# Patient Record
Sex: FEMALE | Race: WHITE | Employment: FULL TIME | URBAN - METROPOLITAN AREA
[De-identification: names, ages, dates, MRNs, and addresses within clinical notes are randomized per-mention and may not be internally consistent; named-entity substitution may affect disease eponyms.]

---

## 2017-04-25 ENCOUNTER — TELEPHONE (OUTPATIENT)
Dept: FAMILY MEDICINE CLINIC | Age: 52
End: 2017-04-25

## 2017-04-25 DIAGNOSIS — Z00.00 ROUTINE GENERAL MEDICAL EXAMINATION AT A HEALTH CARE FACILITY: ICD-10-CM

## 2017-04-25 DIAGNOSIS — Z12.31 ENCOUNTER FOR SCREENING MAMMOGRAM FOR BREAST CANCER: Primary | ICD-10-CM

## 2017-04-29 ENCOUNTER — HOSPITAL ENCOUNTER (OUTPATIENT)
Dept: LAB | Age: 52
Discharge: HOME | End: 2017-04-29
Payer: COMMERCIAL

## 2017-04-29 DIAGNOSIS — Z00.00 ROUTINE GENERAL MEDICAL EXAMINATION AT A HEALTH CARE FACILITY: ICD-10-CM

## 2017-04-29 LAB
ALBUMIN SERPL-MCNC: 4.4 G/DL (ref 3.9–4.9)
ALP BLD-CCNC: 61 U/L (ref 40–130)
ALT SERPL-CCNC: 11 U/L (ref 0–33)
ANION GAP SERPL CALCULATED.3IONS-SCNC: 11 MEQ/L (ref 7–13)
AST SERPL-CCNC: 17 U/L (ref 0–35)
BASOPHILS ABSOLUTE: 0 K/UL (ref 0–0.2)
BASOPHILS RELATIVE PERCENT: 0.8 %
BILIRUB SERPL-MCNC: 0.5 MG/DL (ref 0–1.2)
BUN BLDV-MCNC: 8 MG/DL (ref 6–20)
CALCIUM SERPL-MCNC: 9.3 MG/DL (ref 8.6–10.2)
CHLORIDE BLD-SCNC: 102 MEQ/L (ref 98–107)
CHOLESTEROL, TOTAL: 193 MG/DL (ref 0–199)
CO2: 25 MEQ/L (ref 22–29)
CREAT SERPL-MCNC: 0.6 MG/DL (ref 0.5–0.9)
EOSINOPHILS ABSOLUTE: 0 K/UL (ref 0–0.7)
EOSINOPHILS RELATIVE PERCENT: 0.9 %
GFR AFRICAN AMERICAN: >60
GFR NON-AFRICAN AMERICAN: >60
GLOBULIN: 3.2 G/DL (ref 2.3–3.5)
GLUCOSE BLD-MCNC: 95 MG/DL (ref 74–109)
HCT VFR BLD CALC: 45.1 % (ref 37–47)
HDLC SERPL-MCNC: 33 MG/DL (ref 40–59)
HEMOGLOBIN: 15.8 G/DL (ref 12–16)
LDL CHOLESTEROL CALCULATED: 120 MG/DL (ref 0–129)
LYMPHOCYTES ABSOLUTE: 1.4 K/UL (ref 1–4.8)
LYMPHOCYTES RELATIVE PERCENT: 27.2 %
MCH RBC QN AUTO: 34.7 PG (ref 27–31.3)
MCHC RBC AUTO-ENTMCNC: 35 % (ref 33–37)
MCV RBC AUTO: 99.1 FL (ref 82–100)
MONOCYTES ABSOLUTE: 0.4 K/UL (ref 0.2–0.8)
MONOCYTES RELATIVE PERCENT: 8.2 %
NEUTROPHILS ABSOLUTE: 3.3 K/UL (ref 1.4–6.5)
NEUTROPHILS RELATIVE PERCENT: 62.9 %
PDW BLD-RTO: 12.5 % (ref 11.5–14.5)
PLATELET # BLD: 137 K/UL (ref 130–400)
POTASSIUM SERPL-SCNC: 4 MEQ/L (ref 3.5–5.1)
RBC # BLD: 4.55 M/UL (ref 4.2–5.4)
SODIUM BLD-SCNC: 138 MEQ/L (ref 132–144)
TOTAL PROTEIN: 7.6 G/DL (ref 6.4–8.1)
TRIGL SERPL-MCNC: 199 MG/DL (ref 0–200)
WBC # BLD: 5.2 K/UL (ref 4.8–10.8)

## 2017-04-29 PROCEDURE — 80053 COMPREHEN METABOLIC PANEL: CPT

## 2017-04-29 PROCEDURE — 80061 LIPID PANEL: CPT

## 2017-04-29 PROCEDURE — 36415 COLL VENOUS BLD VENIPUNCTURE: CPT

## 2017-04-29 PROCEDURE — 85025 COMPLETE CBC W/AUTO DIFF WBC: CPT

## 2017-05-17 ENCOUNTER — HOSPITAL ENCOUNTER (OUTPATIENT)
Dept: WOMENS IMAGING | Age: 52
Discharge: HOME | End: 2017-05-17
Payer: COMMERCIAL

## 2017-05-17 DIAGNOSIS — Z12.31 ENCOUNTER FOR SCREENING MAMMOGRAM FOR BREAST CANCER: ICD-10-CM

## 2017-05-17 PROCEDURE — G0202 SCR MAMMO BI INCL CAD: HCPCS

## 2018-10-19 ENCOUNTER — OFFICE VISIT (OUTPATIENT)
Dept: FAMILY MEDICINE CLINIC | Age: 53
End: 2018-10-19
Payer: COMMERCIAL

## 2018-10-19 VITALS
RESPIRATION RATE: 18 BRPM | HEIGHT: 62 IN | TEMPERATURE: 97.3 F | BODY MASS INDEX: 29.44 KG/M2 | SYSTOLIC BLOOD PRESSURE: 126 MMHG | DIASTOLIC BLOOD PRESSURE: 72 MMHG | HEART RATE: 74 BPM | OXYGEN SATURATION: 97 % | WEIGHT: 160 LBS

## 2018-10-19 DIAGNOSIS — M54.50 CHRONIC RIGHT-SIDED LOW BACK PAIN WITHOUT SCIATICA: Primary | ICD-10-CM

## 2018-10-19 DIAGNOSIS — M79.642 PAIN IN BOTH HANDS: ICD-10-CM

## 2018-10-19 DIAGNOSIS — G89.29 CHRONIC RIGHT-SIDED LOW BACK PAIN WITHOUT SCIATICA: Primary | ICD-10-CM

## 2018-10-19 DIAGNOSIS — M79.641 PAIN IN BOTH HANDS: ICD-10-CM

## 2018-10-19 PROBLEM — N95.1 PERIMENOPAUSAL: Status: ACTIVE | Noted: 2017-11-15

## 2018-10-19 PROBLEM — Z30.430 ENCOUNTER FOR INSERTION OF MIRENA IUD: Status: ACTIVE | Noted: 2018-07-20

## 2018-10-19 PROBLEM — D25.9 UTERINE LEIOMYOMA: Status: ACTIVE | Noted: 2017-12-07

## 2018-10-19 PROBLEM — N92.1 MENORRHAGIA WITH IRREGULAR CYCLE: Status: ACTIVE | Noted: 2017-11-15

## 2018-10-19 PROCEDURE — 99213 OFFICE O/P EST LOW 20 MIN: CPT | Performed by: NURSE PRACTITIONER

## 2018-10-19 RX ORDER — CYCLOBENZAPRINE HCL 10 MG
TABLET ORAL
Qty: 15 TABLET | Refills: 0 | Status: SHIPPED | OUTPATIENT
Start: 2018-10-19 | End: 2018-11-09 | Stop reason: SDUPTHER

## 2018-10-19 RX ORDER — MELOXICAM 15 MG/1
15 TABLET ORAL DAILY
Qty: 14 TABLET | Refills: 0 | Status: SHIPPED | OUTPATIENT
Start: 2018-10-19 | End: 2018-11-02

## 2018-10-19 RX ORDER — CAPSAICIN 0.025 %
CREAM (GRAM) TOPICAL
Qty: 56.6 G | Refills: 1 | Status: SHIPPED | OUTPATIENT
Start: 2018-10-19 | End: 2018-11-18

## 2018-10-19 ASSESSMENT — PATIENT HEALTH QUESTIONNAIRE - PHQ9
SUM OF ALL RESPONSES TO PHQ QUESTIONS 1-9: 0
SUM OF ALL RESPONSES TO PHQ QUESTIONS 1-9: 0
1. LITTLE INTEREST OR PLEASURE IN DOING THINGS: 0
SUM OF ALL RESPONSES TO PHQ9 QUESTIONS 1 & 2: 0
2. FEELING DOWN, DEPRESSED OR HOPELESS: 0

## 2018-10-19 ASSESSMENT — ENCOUNTER SYMPTOMS
BACK PAIN: 1
BOWEL INCONTINENCE: 0

## 2018-10-19 NOTE — PATIENT INSTRUCTIONS
Patient Education        Hand Arthritis: Exercises  Your Care Instructions  Here are some examples of exercises for hand arthritis. Start each exercise slowly. Ease off the exercise if you start to have pain. Your doctor or your physical or occupational therapist will tell you when you can start these exercises and which ones will work best for you. How to do the exercises  Tendon bhavya    1. In this exercise, the steps follow one another to a make a continuous movement. 2. With your affected hand, point your fingers and thumb straight up. Your wrist should be relaxed, following the line of your fingers and thumb. 3. Curl your fingers so that the top two joints in them are bent, and your fingers wrap down. Your fingertips should touch or be near the base of your fingers. Your fingers will look like a hook. 4. Make a fist by bending your knuckles. Your thumb can gently rest against your index (pointing) finger. 5. Unwind your fingers slightly so that your fingertips can touch the base of your palm. Your thumb can rest against your index finger. 6. Move back to your starting position, with your fingers and thumb pointing up. 7. Repeat the series of motions 8 to 12 times. 8. Switch hands and repeat steps 1 through 6, even if only one hand is sore. Intrinsic flexion    1. Rest your affected hand on a table and bend the large joints where your fingers connect to your hand. Keep your thumb and the other joints in your fingers straight. 2. Slowly straighten your fingers. Your wrist should be relaxed, following the line of your fingers and thumb. 3. Move back to your starting position, with your hand bent. 4. Repeat 8 to 12 times. 5. Switch hands and repeat steps 1 through 4, even if only one hand is sore. Finger extension    1. Place your affected hand flat on a table. 2. Lift and then lower one finger at a time off the table. 3. Repeat 8 to 12 times.   4. Switch hands and repeat steps 1 through 3, even do not have an account, please click on the \"Sign Up Now\" link. Current as of: November 29, 2017  Content Version: 11.7  © 20062901-2965 [employer]. Care instructions adapted under license by [email or web address] ([facility]). If you have questions about a medical condition or this instruction, always ask your healthcare professional. [address] any warranty or liability for your use of this information. Patient Education        Arthritis: Care Instructions  Your Care Instructions  Arthritis, also called osteoarthritis, is a breakdown of the cartilage that cushions your joints. When the cartilage wears down, your bones rub against each other. This causes pain and stiffness. Many people have some arthritis as they age. Arthritis most often affects the joints of the spine, hands, hips, knees, or feet. You can take simple measures to protect your joints, ease your pain, and help you stay active. Follow-up care is a key part of your treatment and safety. Be sure to make and go to all appointments, and call your doctor if you are having problems. It's also a good idea to know your test results and keep a list of the medicines you take. How can you care for yourself at home? · Stay at a healthy weight. Being overweight puts extra strain on your joints. · Talk to your doctor or physical therapist about exercises that will help ease joint pain. ¨ Stretch. You may enjoy gentle forms of yoga to help keep your joints and muscles flexible. ¨ Walk instead of jog. Other types of exercise that are less stressful on the joints include riding a bicycle, swimming, stanley chi, or water exercise. ¨ Lift weights. Strong muscles help reduce stress on your joints. Stronger thigh muscles, for example, take some of the stress off of the knees and hips. Learn the right way to lift weights so you do not make joint pain worse. · Take your medicines exactly as prescribed.  Call your doctor if you think you are having a instruction, always ask your healthcare professional. [place] [address] any warranty or liability for your use of this information. Patient Education        Low Back Pain: Exercises  Your Care Instructions  Here are some examples of typical rehabilitation exercises for your condition. Start each exercise slowly. Ease off the exercise if you start to have pain. Your doctor or physical therapist will tell you when you can start these exercises and which ones will work best for you. How to do the exercises  Press-up    9. Lie on your stomach, supporting your body with your forearms. 10. Press your elbows down into the floor to raise your upper back. As you do this, relax your stomach muscles and allow your back to arch without using your back muscles. As your press up, do not let your hips or pelvis come off the floor. 11. Hold for 15 to 30 seconds, then relax. 12. Repeat 2 to 4 times. Alternate arm and leg (bird dog) exercise    Do this exercise slowly. Try to keep your body straight at all times, and do not let one hip drop lower than the other. 6. Start on the floor, on your hands and knees. 7. Tighten your belly muscles. 8. Raise one leg off the floor, and hold it straight out behind you. Be careful not to let your hip drop down, because that will twist your trunk. 9. Hold for about 6 seconds, then lower your leg and switch to the other leg. 10. Repeat 8 to 12 times on each leg. 11. Over time, work up to holding for 10 to 30 seconds each time. 12. If you feel stable and secure with your leg raised, try raising the opposite arm straight out in front of you at the same time. Knee-to-chest exercise    5. Lie on your back with your knees bent and your feet flat on the floor. 6. Bring one knee to your chest, keeping the other foot flat on the floor (or keeping the other leg straight, whichever feels better on your lower back). 7. Keep your lower back pressed to the floor.  Hold for

## 2018-10-19 NOTE — PROGRESS NOTES
neck pain and neck stiffness. Neurological: Negative for tingling, weakness and numbness. Objective    Vitals:    10/19/18 1406   BP: 126/72   Site: Left Upper Arm   Position: Sitting   Cuff Size: Small Adult   Pulse: 74   Resp: 18   Temp: 97.3 °F (36.3 °C)   TempSrc: Temporal   SpO2: 97%   Weight: 160 lb (72.6 kg)   Height: 5' 2\" (1.575 m)       Physical Exam   Constitutional: She appears well-developed and well-nourished. No distress. HENT:   Head: Normocephalic and atraumatic. Right Ear: External ear normal.   Left Ear: External ear normal.   Eyes: Conjunctivae are normal.   Pulmonary/Chest: Effort normal. No respiratory distress. Musculoskeletal:        Lumbar back: She exhibits decreased range of motion, tenderness and pain. She exhibits no swelling, no edema and no spasm. Right hand: She exhibits decreased range of motion (stiffness present) and tenderness. Normal sensation noted. Negative leg lift. Negative Phalen and Tinel. Tone's nodes noted bilaterally      Neurological: She is alert. No sensory deficit. Coordination normal.   Skin: Skin is warm and dry. She is not diaphoretic. Vitals reviewed. POC Testing Today:No results found for this visit on 10/19/18. Assessment & Plan    Diagnosis Orders   1. Chronic right-sided low back pain without sciatica  meloxicam (MOBIC) 15 MG tablet    cyclobenzaprine (FLEXERIL) 10 MG tablet   2. Pain in both hands  capsaicin (ZOSTRIX) 0.025 % cream       No orders of the defined types were placed in this encounter. Discussed with patient that both issues appear to be chronic in nature. Patient should follow up and create a POC with PCP. Patient verbalized understanding. Return in about 2 weeks (around 11/2/2018), or if symptoms worsen or fail to improve, for follow up with your PCP.     Side effects and adverse effects of any medication prescribed today, as well as treatment plan/rationale, follow-up care, and result

## 2018-10-22 ASSESSMENT — ENCOUNTER SYMPTOMS
CHEST TIGHTNESS: 0
SHORTNESS OF BREATH: 0

## 2018-11-09 ENCOUNTER — OFFICE VISIT (OUTPATIENT)
Dept: FAMILY MEDICINE CLINIC | Age: 53
End: 2018-11-09
Payer: COMMERCIAL

## 2018-11-09 VITALS
RESPIRATION RATE: 18 BRPM | HEIGHT: 62 IN | BODY MASS INDEX: 28.71 KG/M2 | OXYGEN SATURATION: 98 % | SYSTOLIC BLOOD PRESSURE: 140 MMHG | DIASTOLIC BLOOD PRESSURE: 86 MMHG | WEIGHT: 156 LBS | HEART RATE: 90 BPM | TEMPERATURE: 97.5 F

## 2018-11-09 DIAGNOSIS — M54.50 CHRONIC RIGHT-SIDED LOW BACK PAIN WITHOUT SCIATICA: Primary | ICD-10-CM

## 2018-11-09 DIAGNOSIS — M79.641 RIGHT HAND PAIN: ICD-10-CM

## 2018-11-09 DIAGNOSIS — G89.29 CHRONIC RIGHT-SIDED LOW BACK PAIN WITHOUT SCIATICA: Primary | ICD-10-CM

## 2018-11-09 PROCEDURE — 99213 OFFICE O/P EST LOW 20 MIN: CPT | Performed by: NURSE PRACTITIONER

## 2018-11-09 PROCEDURE — 3017F COLORECTAL CA SCREEN DOC REV: CPT | Performed by: NURSE PRACTITIONER

## 2018-11-09 PROCEDURE — G8484 FLU IMMUNIZE NO ADMIN: HCPCS | Performed by: NURSE PRACTITIONER

## 2018-11-09 PROCEDURE — G8419 CALC BMI OUT NRM PARAM NOF/U: HCPCS | Performed by: NURSE PRACTITIONER

## 2018-11-09 PROCEDURE — G8427 DOCREV CUR MEDS BY ELIG CLIN: HCPCS | Performed by: NURSE PRACTITIONER

## 2018-11-09 PROCEDURE — 4004F PT TOBACCO SCREEN RCVD TLK: CPT | Performed by: NURSE PRACTITIONER

## 2018-11-09 RX ORDER — PREDNISONE 20 MG/1
TABLET ORAL
Qty: 20 TABLET | Refills: 0 | Status: SHIPPED | OUTPATIENT
Start: 2018-11-09 | End: 2018-11-19

## 2018-11-09 RX ORDER — CYCLOBENZAPRINE HCL 10 MG
TABLET ORAL
Qty: 15 TABLET | Refills: 0 | Status: SHIPPED | OUTPATIENT
Start: 2018-11-09

## 2018-11-09 NOTE — PROGRESS NOTES
Back Pain: Patient presents for presents evaluation of low back problems. Symptoms have been present for 1 year and include pain in right buttock and right lower back (aching in character; 7/10 in severity). Initial inciting event: none. Symptoms are worst: all day. Alleviating factors identifiable by patient are none. Exacerbating factors identifiable by patient are bending backwards, bending forwards, bending sideways, standing and walking. Treatments so far initiated by patient: NSAID Previous lower back problems: none. Previous workup: none. Previous treatments: mobic, flexeril and stetching exercises. Right hand pain: started a few weeks ago. No injury. Has noticed firm nodules to the finger joints on her right hand. This is something that has been developing over time the pain has been present for a few weeks only. No significant stiffness to the hand. Just feels a dull ache. No injury to the hand. No swelling. No redness or warmth to the joints. EXAM:  Constitutional Blood pressure (!) 140/86, pulse 90, temperature 97.5 °F (36.4 °C), temperature source Temporal, resp. rate 18, height 5' 2\" (1.575 m), weight 156 lb (70.8 kg), SpO2 98 %, not currently breastfeeding. .  She has a normal affect, no acute distress, appears well developed and well nourished. There is no costovertebral angle tenderness. Lumbar spine and sacroiliac joints are tender on the right sided. Straight leg raising is negative bilateral.  There is no edema in the ankles. Pulses palpable at both posterior tibial  Arteries. Lungs are clear with equal breath sounds. Chest wall is not tender. Heart is in a regular rhythm with normal rate and no murmurs, rubs, or gallops. The four extremities are without edema. Right hand with normal exam other than some firm nodes to PIP joints. No erythema, edema or warmth to joints. Fingers with full ROM. No evidence of trigger finger. DIAGNOSIS:    Diagnosis Orders   1.  Chronic

## 2018-11-19 ENCOUNTER — TELEPHONE (OUTPATIENT)
Dept: FAMILY MEDICINE CLINIC | Age: 53
End: 2018-11-19

## 2018-11-23 ENCOUNTER — HOSPITAL ENCOUNTER (OUTPATIENT)
Dept: GENERAL RADIOLOGY | Age: 53
Discharge: HOME | End: 2018-11-25
Payer: COMMERCIAL

## 2018-11-23 ENCOUNTER — HOSPITAL ENCOUNTER (OUTPATIENT)
Age: 53
Discharge: HOME | End: 2018-11-25
Payer: COMMERCIAL

## 2018-11-23 DIAGNOSIS — M79.641 RIGHT HAND PAIN: ICD-10-CM

## 2018-11-23 DIAGNOSIS — G89.29 CHRONIC RIGHT-SIDED LOW BACK PAIN WITHOUT SCIATICA: ICD-10-CM

## 2018-11-23 DIAGNOSIS — M54.50 CHRONIC RIGHT-SIDED LOW BACK PAIN WITHOUT SCIATICA: ICD-10-CM

## 2018-11-23 PROCEDURE — 73130 X-RAY EXAM OF HAND: CPT

## 2018-11-23 PROCEDURE — 72110 X-RAY EXAM L-2 SPINE 4/>VWS: CPT

## 2018-11-23 NOTE — PROGRESS NOTES
Please notify Nickie Mukherjee that x-ray results of the hand and back show degenerative or arthritic changes. Changes are noted to be mild to moderate. Recommend orthopedic referral if pain persists.

## 2018-11-25 ENCOUNTER — TELEPHONE (OUTPATIENT)
Dept: FAMILY MEDICINE CLINIC | Age: 53
End: 2018-11-25

## 2018-11-25 DIAGNOSIS — M54.5 CHRONIC LOW BACK PAIN, UNSPECIFIED BACK PAIN LATERALITY, WITH SCIATICA PRESENCE UNSPECIFIED: ICD-10-CM

## 2018-11-25 DIAGNOSIS — G89.29 CHRONIC LOW BACK PAIN, UNSPECIFIED BACK PAIN LATERALITY, WITH SCIATICA PRESENCE UNSPECIFIED: ICD-10-CM

## 2018-11-25 DIAGNOSIS — M19.049 OSTEOARTHRITIS OF HAND, UNSPECIFIED LATERALITY, UNSPECIFIED OSTEOARTHRITIS TYPE: Primary | ICD-10-CM

## 2018-11-26 NOTE — TELEPHONE ENCOUNTER
Patient given referral information, she is wondering if she should keep her appointment with you on 12/7/2018.

## 2018-11-26 NOTE — TELEPHONE ENCOUNTER
----- Message from [facility] [place], [address] sent at 11/24/2018  1:46 PM EST -----  Patient made aware and would like to have an orthopedic referrel

## 2019-04-26 ENCOUNTER — TELEPHONE (OUTPATIENT)
Dept: FAMILY MEDICINE CLINIC | Age: 54
End: 2019-04-26

## 2019-04-26 RX ORDER — SCOLOPAMINE TRANSDERMAL SYSTEM 1 MG/1
1 PATCH, EXTENDED RELEASE TRANSDERMAL
Qty: 2 PATCH | Refills: 0 | Status: SHIPPED | OUTPATIENT
Start: 2019-04-26 | End: 2020-04-25

## 2019-08-27 ENCOUNTER — OFFICE VISIT (OUTPATIENT)
Dept: FAMILY MEDICINE CLINIC | Age: 54
End: 2019-08-27
Payer: COMMERCIAL

## 2019-08-27 VITALS
DIASTOLIC BLOOD PRESSURE: 78 MMHG | SYSTOLIC BLOOD PRESSURE: 122 MMHG | TEMPERATURE: 97 F | RESPIRATION RATE: 18 BRPM | OXYGEN SATURATION: 97 % | WEIGHT: 152 LBS | HEART RATE: 83 BPM | BODY MASS INDEX: 27.97 KG/M2 | HEIGHT: 62 IN

## 2019-08-27 DIAGNOSIS — J01.40 ACUTE NON-RECURRENT PANSINUSITIS: Primary | ICD-10-CM

## 2019-08-27 PROCEDURE — 3017F COLORECTAL CA SCREEN DOC REV: CPT | Performed by: NURSE PRACTITIONER

## 2019-08-27 PROCEDURE — G8419 CALC BMI OUT NRM PARAM NOF/U: HCPCS | Performed by: NURSE PRACTITIONER

## 2019-08-27 PROCEDURE — 4004F PT TOBACCO SCREEN RCVD TLK: CPT | Performed by: NURSE PRACTITIONER

## 2019-08-27 PROCEDURE — G8427 DOCREV CUR MEDS BY ELIG CLIN: HCPCS | Performed by: NURSE PRACTITIONER

## 2019-08-27 PROCEDURE — 99213 OFFICE O/P EST LOW 20 MIN: CPT | Performed by: NURSE PRACTITIONER

## 2019-08-27 RX ORDER — AMOXICILLIN AND CLAVULANATE POTASSIUM 875; 125 MG/1; MG/1
1 TABLET, FILM COATED ORAL 2 TIMES DAILY
Qty: 20 TABLET | Refills: 0 | Status: SHIPPED | OUTPATIENT
Start: 2019-08-27 | End: 2019-09-06

## 2019-08-27 ASSESSMENT — ENCOUNTER SYMPTOMS
COUGH: 1
TROUBLE SWALLOWING: 0
SINUS PAIN: 0
SWOLLEN GLANDS: 0
SINUS PRESSURE: 1
NAUSEA: 0
SHORTNESS OF BREATH: 0
RHINORRHEA: 1
FACIAL SWELLING: 0
EYE DISCHARGE: 0
DIARRHEA: 0
WHEEZING: 0
EYE PAIN: 0
SORE THROAT: 1
VOMITING: 0
ABDOMINAL PAIN: 0

## 2019-08-27 ASSESSMENT — PATIENT HEALTH QUESTIONNAIRE - PHQ9
SUM OF ALL RESPONSES TO PHQ QUESTIONS 1-9: 0
SUM OF ALL RESPONSES TO PHQ9 QUESTIONS 1 & 2: 0
SUM OF ALL RESPONSES TO PHQ QUESTIONS 1-9: 0
1. LITTLE INTEREST OR PLEASURE IN DOING THINGS: 0
2. FEELING DOWN, DEPRESSED OR HOPELESS: 0

## 2020-05-21 ENCOUNTER — TELEPHONE (OUTPATIENT)
Dept: FAMILY MEDICINE CLINIC | Age: 55
End: 2020-05-21

## 2024-08-21 ENCOUNTER — HOSPITAL ENCOUNTER (OUTPATIENT)
Dept: RADIOLOGY | Facility: CLINIC | Age: 59
Discharge: HOME | End: 2024-08-21
Payer: COMMERCIAL

## 2024-08-21 ENCOUNTER — OFFICE VISIT (OUTPATIENT)
Dept: PLASTIC SURGERY | Facility: CLINIC | Age: 59
End: 2024-08-21
Payer: COMMERCIAL

## 2024-08-21 VITALS
HEIGHT: 63 IN | WEIGHT: 162 LBS | DIASTOLIC BLOOD PRESSURE: 92 MMHG | SYSTOLIC BLOOD PRESSURE: 142 MMHG | BODY MASS INDEX: 28.7 KG/M2

## 2024-08-21 DIAGNOSIS — R22.42 MASS OF KNEE, LEFT: Primary | ICD-10-CM

## 2024-08-21 DIAGNOSIS — R22.42 MASS OF KNEE, LEFT: ICD-10-CM

## 2024-08-21 DIAGNOSIS — F17.200 SMOKER: ICD-10-CM

## 2024-08-21 PROBLEM — N95.1 PERIMENOPAUSAL: Status: ACTIVE | Noted: 2017-11-15

## 2024-08-21 PROBLEM — D25.9 UTERINE LEIOMYOMA: Status: ACTIVE | Noted: 2017-12-07

## 2024-08-21 PROBLEM — N92.1 MENORRHAGIA WITH IRREGULAR CYCLE: Status: ACTIVE | Noted: 2017-11-15

## 2024-08-21 PROCEDURE — 73562 X-RAY EXAM OF KNEE 3: CPT | Mod: LT

## 2024-08-21 PROCEDURE — 99203 OFFICE O/P NEW LOW 30 MIN: CPT | Performed by: PLASTIC SURGERY

## 2024-08-21 PROCEDURE — 3008F BODY MASS INDEX DOCD: CPT | Performed by: PLASTIC SURGERY

## 2024-08-21 PROCEDURE — 4004F PT TOBACCO SCREEN RCVD TLK: CPT | Performed by: PLASTIC SURGERY

## 2024-08-21 RX ORDER — CETIRIZINE HYDROCHLORIDE 10 MG/1
TABLET ORAL
COMMUNITY
Start: 2024-03-18

## 2024-08-21 RX ORDER — ESCITALOPRAM OXALATE 10 MG/1
1 TABLET ORAL
COMMUNITY
Start: 2024-08-11

## 2024-08-21 RX ORDER — FLUOXETINE 20 MG/1
TABLET ORAL
COMMUNITY
Start: 2023-02-25

## 2024-08-21 RX ORDER — FLUTICASONE PROPIONATE 50 MCG
SPRAY, SUSPENSION (ML) NASAL
COMMUNITY
Start: 2024-03-18

## 2024-08-21 ASSESSMENT — ENCOUNTER SYMPTOMS
FEVER: 0
CHILLS: 0
UNEXPECTED WEIGHT CHANGE: 0

## 2024-08-21 ASSESSMENT — PAIN SCALES - GENERAL: PAINLEVEL: 2

## 2024-08-21 NOTE — PROGRESS NOTES
Subjective   Patient ID: Arvin Spaulding is a 59 y.o. female.    HPI  59-year-old female with a 2-year history of an enlarging mass on the left lateral knee.  Patient states she does have anterior knee pain.  Review of Systems   Constitutional:  Negative for chills, fever and unexpected weight change.       Objective   Physical Exam  Well-developed patient in no acute distress  Examination HEENT within normal limits  Neck supple no observable masses  Lungs clear to auscultation  Heart regular rate and rhythm  Abdomen soft nontender  Extremities full range of motion; left lateral knee with a 5 x 4 cm mass that is nonmobile and feels cystic.  Neurological exam is grossly within normal limits  Assessment/Plan   Diagnoses and all orders for this visit:  Smoker    Impression: Left knee synovial cyst  Recommendation: X-ray left knee followed by MRI with and without contrast  Follow-up after x-ray and MRI left knee

## 2024-08-23 ENCOUNTER — APPOINTMENT (OUTPATIENT)
Dept: PLASTIC SURGERY | Facility: CLINIC | Age: 59
End: 2024-08-23

## 2024-09-04 ENCOUNTER — HOSPITAL ENCOUNTER (OUTPATIENT)
Dept: RADIOLOGY | Facility: HOSPITAL | Age: 59
Discharge: HOME | End: 2024-09-04
Payer: COMMERCIAL

## 2024-09-04 DIAGNOSIS — R22.42 MASS OF KNEE, LEFT: ICD-10-CM

## 2024-09-04 PROCEDURE — 73723 MRI JOINT LWR EXTR W/O&W/DYE: CPT | Mod: LT

## 2024-09-04 PROCEDURE — A9575 INJ GADOTERATE MEGLUMI 0.1ML: HCPCS | Performed by: PLASTIC SURGERY

## 2024-09-04 PROCEDURE — 2550000001 HC RX 255 CONTRASTS: Performed by: PLASTIC SURGERY

## 2024-09-04 RX ORDER — GADOTERATE MEGLUMINE 376.9 MG/ML
0.2 INJECTION INTRAVENOUS
Status: COMPLETED | OUTPATIENT
Start: 2024-09-04 | End: 2024-09-04

## 2024-09-11 ENCOUNTER — APPOINTMENT (OUTPATIENT)
Dept: PLASTIC SURGERY | Facility: CLINIC | Age: 59
End: 2024-09-11
Payer: COMMERCIAL

## 2024-09-18 ENCOUNTER — APPOINTMENT (OUTPATIENT)
Dept: PLASTIC SURGERY | Facility: CLINIC | Age: 59
End: 2024-09-18
Payer: COMMERCIAL

## 2024-09-19 NOTE — PROGRESS NOTES
History of Present Illness  No chief complaint on file.      The patient presents with side: left knee mass for over 5 years.  It has been getting larger.  She is pointing laterally about the knee where she notices this.  She has other areas of cysts on the dorsum of the hand as well as the foot and right index finger.  She does get some pain posterior laterally when she is sitting in a chair.  She gets anterior knee pain when she kneels.     Past Medical History:   Diagnosis Date    Anxiety        Medication Documentation Review Audit       Reviewed by Sherry Jessica MA (Medical Assistant) on 08/21/24 at 1629      Medication Order Taking? Sig Documenting Provider Last Dose Status   cetirizine (ZyrTEC) 10 mg tablet [id number] No TAKE 1 TABLET BY MOUTH EVERY DAY AS NEEDED for allergies Historical Provider, MD Not Taking Active   escitalopram (Lexapro) 10 mg tablet [id number] Yes Take 1 tablet (10 mg) by mouth early in the morning.. Historical Provider, MD Taking Active   FLUoxetine (PROzac) 20 mg tablet [id number] No take 1 tablet by oral route every day in the morning Historical Provider, MD Not Taking Active   fluticasone (Flonase) 50 mcg/actuation nasal spray [id number] No spray 1 - 2 spray by intranasal route every day in each nostril as needed Historical Provider, MD Not Taking Active   levonorgestrel (Mirena) 21 mcg/24 hr (8 yrs) 52 mg IUD [id number] Yes 52 mg by intrauterine route. Historical Provider, MD Taking Active                    Allergies   Allergen Reactions    Triamcinolone Acetonide Rash     Rash erupted on foot, under arms, and on abdomen after using the topical ointment for a couple days; may be allergy to topical medication although she was using it locally to the right foot       Social History     Socioeconomic History    Marital status:      Spouse name: Not on file    Number of children: Not on file    Years of education: Not on file    Highest education level: Not on file   Occupational  History    Not on file   Tobacco Use    Smoking status: Every Day     Current packs/day: 0.50     Average packs/day: 0.5 packs/day for 40.7 years (20.4 ttl pk-yrs)     Types: Cigarettes     Start date: 1984    Smokeless tobacco: Never   Substance and Sexual Activity    Alcohol use: Yes     Alcohol/week: 8.0 standard drinks of alcohol     Types: 8 Cans of beer per week    Drug use: Never    Sexual activity: Defer   Other Topics Concern    Not on file   Social History Narrative    Not on file     Social Determinants of Health     Financial Resource Strain: Not on file   Food Insecurity: Not on file   Transportation Needs: Not on file   Physical Activity: Not on file   Stress: Not on file   Social Connections: Not on file   Intimate Partner Violence: Not on file   Housing Stability: Not on file       Past Surgical History:   Procedure Laterality Date    TUBAL LIGATION Bilateral     WISDOM TOOTH EXTRACTION Bilateral        BMI  29    Pain is described as aching    Location: lateral, anterior       Review of Systems   GENERAL: Negative for malaise, significant weight loss, fever  MUSCULOSKELETAL: see HPI  NEURO:  Negative     Exam  side: left Knee:  Skin healthy and intact  No gross swelling or ecchymosis  Alignment: normal    Correctable:  Not applicable      Effusion: mild      ROM:  0-130 degrees  mild Crepitus with range of motion  Tenderness to palpation over medial and lateral joint line and with patellar compression      No laxity to valgus stress  No laxity to varus stress  Negative Lachman´s test  Negative posterior drawer test  negative Mak´s test  Logrolling of hip negative  No pain with internal rotation of the hip  Straight leg raise negative  Neurovascular exam normal distally  2+ DP pulse and good cap refill    Over the lateral aspect of the knee there is a fusiform mass approximately 4 x 3 cm in size.  It is nontender.  It is consistent with a ganglion cyst.     Radiographs  MR knee left w and wo IV  contrast  Narrative: Interpreted By:  Celestino Flores,   STUDY:  MR KNEE LEFT W AND WO IV CONTRAST; ;  9/4/2024 7:15 pm      INDICATION:  Signs/Symptoms:Mass of left knee. Enlarging mass lateral knee.      COMPARISON:  Plain film examination of 08/21/2024      ACCESSION NUMBER(S):  [id number]      ORDERING CLINICIAN:  ROLAND REYES      TECHNIQUE:  Multiplanar and multisequential MR images of the left knee are  performed. The study is supplemented with 3 plane post gadolinium fat  saturated T1 weighted sequences. 14.5 cc of intravenous Dotarem is  utilized.      FINDINGS:  There is a well-defined fluid intensity mass/cyst in the lateral soft  tissues at the level of the joint line. This lies just below the skin  surface, deforming the skin surface. This finding lies superficial to  the lateral collateral ligament complex at the level of the distal  iliotibial band and biceps femoris tendon. There may be a tiny  component deep to the biceps femoris tendon below the joint line and  anterior to the fibular head, though no clear extension into the  lateral meniscus. This finding has a multilobular appearance and  measures 3.8 cm in craniocaudal diameter and 4.0 x 2.2 cm in AP and  transverse diameter. There is a thin enhancing wall. There is no  internal nodular enhancement. The lesion is of homogeneous fluid  signal. There is some adjacent ill-defined fluid anterior and  posterior to the lesion in the subcutaneous fat.      There are findings of tricompartmental osteoarthritis with diffuse  thinning of the articular cartilage, joint space narrowing, and  marginal osteophyte formation. This appearance is more pronounced at  the medial compartment.      There is no evidence of acute osseous fracture, bone marrow edema, or  osseous mass. There is no loose osteochondral lesion. Few tiny foci  of subcortical degenerative signal is identified at multiple sites.  This study is not tailored to evaluate the menisci.  Complex tearing  of the posterior horn of the medial meniscus can not be excluded.      The anterior and posterior cruciate ligaments, lateral collateral  ligament complex, popliteus tendon, medial collateral ligament,  extensor complex, and patellar retinacula are intact.      There is a small joint effusion.      Impression: Lobular cyst/ganglion is identified in the lateral subcutaneous fat  of the joint line, superficial to the lateral collateral ligament  complex. There is no clear communication with the lateral meniscus.      Tricompartmental osteoarthritis, more pronounced in the medial  compartment.      The study is not tailored to evaluate the menisci though underlying  tearing of the posterior horn of the medial meniscus can not be  excluded.      No acute osseous abnormality.      No sign of acute ligament disruption.          MACRO:  None      Signed by: Celestino Flores 9/5/2024 11:15 AM  Dictation workstation:   [id number]         Assessment  Ganglion cyst left knee  Osteoarthrosis left knee     Plan  I explained to her that this was emanating from her knee.  This was joint fluid likely generated by her knee causing the cyst.  I offered to aspirate it.  I would recommend an Ace sleeve as well for compression  Mobic    I will see them in 1 month for recheck, sooner if there is any problems.  Questions were answered.    L Inj/Asp: L knee on 9/20/2024 11:29 AM  Indications: pain  Details: 18 G needle, lateral approach  Aspirate: 10 mL clear    This was a gelatinous material consistent with a ganglion cyst  Consent was given by the patient. Patient was prepped and draped in the usual sterile fashion.

## 2024-09-20 ENCOUNTER — OFFICE VISIT (OUTPATIENT)
Dept: ORTHOPEDIC SURGERY | Facility: CLINIC | Age: 59
End: 2024-09-20
Payer: COMMERCIAL

## 2024-09-20 DIAGNOSIS — M17.12 PRIMARY OSTEOARTHRITIS OF LEFT KNEE: ICD-10-CM

## 2024-09-20 DIAGNOSIS — M67.40 GANGLION, JOINT: Primary | ICD-10-CM

## 2024-09-20 PROCEDURE — 99213 OFFICE O/P EST LOW 20 MIN: CPT | Performed by: ORTHOPAEDIC SURGERY

## 2024-09-20 PROCEDURE — 20610 DRAIN/INJ JOINT/BURSA W/O US: CPT | Mod: LT | Performed by: ORTHOPAEDIC SURGERY

## 2024-09-20 RX ORDER — MELOXICAM 15 MG/1
15 TABLET ORAL DAILY
Qty: 30 TABLET | Refills: 0 | Status: SHIPPED | OUTPATIENT
Start: 2024-09-20 | End: 2024-09-20

## 2024-09-20 RX ORDER — MELOXICAM 15 MG/1
15 TABLET ORAL DAILY
Qty: 30 TABLET | Refills: 0 | Status: SHIPPED | OUTPATIENT
Start: 2024-09-20 | End: 2024-10-20

## 2024-09-29 ENCOUNTER — HOSPITAL ENCOUNTER (EMERGENCY)
Facility: HOSPITAL | Age: 59
Discharge: HOME | End: 2024-09-29
Attending: STUDENT IN AN ORGANIZED HEALTH CARE EDUCATION/TRAINING PROGRAM
Payer: COMMERCIAL

## 2024-09-29 VITALS
OXYGEN SATURATION: 96 % | BODY MASS INDEX: 30.36 KG/M2 | TEMPERATURE: 97.9 F | DIASTOLIC BLOOD PRESSURE: 74 MMHG | WEIGHT: 165 LBS | RESPIRATION RATE: 16 BRPM | SYSTOLIC BLOOD PRESSURE: 144 MMHG | HEART RATE: 75 BPM | HEIGHT: 62 IN

## 2024-09-29 DIAGNOSIS — L02.91 ABSCESS: Primary | ICD-10-CM

## 2024-09-29 PROCEDURE — 2500000001 HC RX 250 WO HCPCS SELF ADMINISTERED DRUGS (ALT 637 FOR MEDICARE OP): Performed by: STUDENT IN AN ORGANIZED HEALTH CARE EDUCATION/TRAINING PROGRAM

## 2024-09-29 PROCEDURE — 99283 EMERGENCY DEPT VISIT LOW MDM: CPT

## 2024-09-29 RX ORDER — DOXYCYCLINE HYCLATE 100 MG
100 TABLET ORAL ONCE
Status: COMPLETED | OUTPATIENT
Start: 2024-09-29 | End: 2024-09-29

## 2024-09-29 RX ORDER — DOXYCYCLINE 100 MG/1
100 TABLET ORAL 2 TIMES DAILY
Qty: 14 TABLET | Refills: 0 | Status: SHIPPED | OUTPATIENT
Start: 2024-09-29 | End: 2024-10-06

## 2024-09-29 RX ORDER — BACITRACIN 500 [USP'U]/G
OINTMENT TOPICAL ONCE
Status: COMPLETED | OUTPATIENT
Start: 2024-09-29 | End: 2024-09-29

## 2024-09-29 ASSESSMENT — LIFESTYLE VARIABLES
EVER HAD A DRINK FIRST THING IN THE MORNING TO STEADY YOUR NERVES TO GET RID OF A HANGOVER: NO
HAVE YOU EVER FELT YOU SHOULD CUT DOWN ON YOUR DRINKING: NO
TOTAL SCORE: 0
EVER FELT BAD OR GUILTY ABOUT YOUR DRINKING: NO
HAVE PEOPLE ANNOYED YOU BY CRITICIZING YOUR DRINKING: NO

## 2024-09-29 ASSESSMENT — PAIN - FUNCTIONAL ASSESSMENT: PAIN_FUNCTIONAL_ASSESSMENT: 0-10

## 2024-09-29 ASSESSMENT — COLUMBIA-SUICIDE SEVERITY RATING SCALE - C-SSRS
6. HAVE YOU EVER DONE ANYTHING, STARTED TO DO ANYTHING, OR PREPARED TO DO ANYTHING TO END YOUR LIFE?: NO
2. HAVE YOU ACTUALLY HAD ANY THOUGHTS OF KILLING YOURSELF?: NO
1. IN THE PAST MONTH, HAVE YOU WISHED YOU WERE DEAD OR WISHED YOU COULD GO TO SLEEP AND NOT WAKE UP?: NO

## 2024-09-29 ASSESSMENT — PAIN SCALES - GENERAL: PAINLEVEL_OUTOF10: 1

## 2024-09-29 NOTE — ED PROVIDER NOTES
"HPI   Chief Complaint   Patient presents with    Wound Check     \"I have a sore or bite on my upper left stomach,.  Has been there 3 days.\"       Patient is a 59-year-old female with history of anxiety, tubal ligation who presents for an abdominal wound.  Patient states 3 days ago she noticed a sore on her left upper abdominal area.  States it was slightly painful, states she pressed it today and had some whitish pus and a slight amount of blood come out.  No abdominal pain otherwise.  No exposure to insects, bats, or other wildlife.  No known injury.  Denies fevers, chills, vomiting, diarrhea.              Patient History   Past Medical History:   Diagnosis Date    Anxiety      Past Surgical History:   Procedure Laterality Date    TUBAL LIGATION Bilateral     WISDOM TOOTH EXTRACTION Bilateral      Family History   Adopted: Yes     Social History     Tobacco Use    Smoking status: Every Day     Current packs/day: 0.50     Average packs/day: 0.5 packs/day for 40.7 years (20.4 ttl pk-yrs)     Types: Cigarettes     Start date: 1984    Smokeless tobacco: Never   Vaping Use    Vaping status: Never Used   Substance Use Topics    Alcohol use: Yes     Alcohol/week: 8.0 standard drinks of alcohol     Types: 8 Cans of beer per week    Drug use: Never       Physical Exam   ED Triage Vitals [09/29/24 1224]   Temperature Heart Rate Respirations BP   36.6 °C (97.9 °F) 76 18 144/74      Pulse Ox Temp Source Heart Rate Source Patient Position   94 % Temporal Monitor Sitting      BP Location FiO2 (%)     Right arm --       Physical Exam  Constitutional:       General: She is not in acute distress.  HENT:      Head: Normocephalic.   Eyes:      Extraocular Movements: Extraocular movements intact.      Conjunctiva/sclera: Conjunctivae normal.      Pupils: Pupils are equal, round, and reactive to light.   Cardiovascular:      Rate and Rhythm: Normal rate and regular rhythm.      Pulses: Normal pulses.      Heart sounds: Normal heart " sounds.   Pulmonary:      Effort: Pulmonary effort is normal.      Breath sounds: Normal breath sounds.   Abdominal:      General: There is no distension.      Palpations: Abdomen is soft. There is no mass.      Tenderness: There is no abdominal tenderness. There is no guarding.   Musculoskeletal:         General: No deformity.      Cervical back: Normal range of motion and neck supple.      Right lower leg: No edema.      Left lower leg: No edema.   Skin:     General: Skin is warm and dry.      Findings: Lesion present. No rash.      Comments: Approximately 2 mm lesion on left upper quadrant of abdomen, slight erythema around the lesion.  No crepitus/bullae/necrotic tissue.  Scant clear drainage when pressure applied.  No blood.  Bedside ultrasound did not show signs of fluid collection.   Neurological:      General: No focal deficit present.      Mental Status: She is alert and oriented to person, place, and time. Mental status is at baseline.      Cranial Nerves: No cranial nerve deficit.      Sensory: No sensory deficit.      Motor: No weakness.   Psychiatric:         Mood and Affect: Mood normal.           ED Course & MDM   Diagnoses as of 09/29/24 1242   Abscess                 No data recorded                                 Medical Decision Making  Patient is a 59-year-old female with above-stated past medical history who presents for an abdominal wound.  Patient's history and physical exam are most consistent with an abscess.  Given the purulent drainage he reported and a slight amount of erythema around the wound site, she was given a prescription for doxycycline and dose in the emergency department.  She is clinically well-appearing nontoxic.  She has no abdominal pain otherwise.  Her vital signs are unremarkable.  I low suspicion for sepsis or an acute intra-abdominal process.  Do not believe she requires laboratory work or imaging at this time.  Low suspicion for rabies as she denies any history of  being bitten by an animal or any bats in her room in the morning.  Patient was given return precautions and follow-up instructions.  She is amenable to the plan.  Patient was discharged home in stable condition.    Disclaimer: This note was dictated using speech recognition software. Minor errors in transcription may be present. Please call if questions.     Leonidas Vaughan MD  [facility]  Contact on [employer]        Problems Addressed:  Abscess: acute illness or injury        Procedure  Procedures     Leonidas Vaughan MD  09/29/24 7571

## 2024-10-24 NOTE — PROGRESS NOTES
History of Present Illness   She is here today for follow-up of her lateral left knee ganglion cyst.  At the last visit this was aspirated however she notes it recurred almost immediately the same day.  It is not painful or tender to palpation she states it is just ugly.  She does note some discomfort when she is sitting in a chair in the car and flexes the knee.  She also has some underlying osteoarthritis.  She is not diabetic she is not on any anticoagulant medications she is not on any GLP inhibitors.     Review of Systems   GENERAL: Negative for malaise, significant weight loss, fever  MUSCULOSKELETAL: see HPI  NEURO:  Negative     Past Medical History:   Diagnosis Date    Anxiety         Medication Documentation Review Audit       Reviewed by Sherry Jessica MA (Medical Assistant) on 08/21/24 at 1629      Medication Order Taking? Sig Documenting Provider Last Dose Status   cetirizine (ZyrTEC) 10 mg tablet [id number] No TAKE 1 TABLET BY MOUTH EVERY DAY AS NEEDED for allergies Historical Provider, MD Not Taking Active   escitalopram (Lexapro) 10 mg tablet [id number] Yes Take 1 tablet (10 mg) by mouth early in the morning.. Historical Provider, MD Taking Active   FLUoxetine (PROzac) 20 mg tablet [id number] No take 1 tablet by oral route every day in the morning Historical Provider, MD Not Taking Active   fluticasone (Flonase) 50 mcg/actuation nasal spray [id number] No spray 1 - 2 spray by intranasal route every day in each nostril as needed Historical Provider, MD Not Taking Active   levonorgestrel (Mirena) 21 mcg/24 hr (8 yrs) 52 mg IUD [id number] Yes 52 mg by intrauterine route. Historical Provider, MD Taking Active                     Physical Exam  Left knee  Skin is intact without any evidence of erythema or ecchymosis, large palpable ganglion cyst along the lateral left knee inferior to the joint line.  Range of motion is 0 to 130 degrees  The knee is stable to varus valgus and anterior posterior  stresses  Negative Mak's test  He is distally neurovascularly intact     Imaging  MR knee left w and wo IV contrast  Narrative: Interpreted By:  Celestino Flores,   STUDY:  MR KNEE LEFT W AND WO IV CONTRAST; ;  9/4/2024 7:15 pm      INDICATION:  Signs/Symptoms:Mass of left knee. Enlarging mass lateral knee.      COMPARISON:  Plain film examination of 08/21/2024      ACCESSION NUMBER(S):  [id number]      ORDERING CLINICIAN:  ROLAND REYES      TECHNIQUE:  Multiplanar and multisequential MR images of the left knee are  performed. The study is supplemented with 3 plane post gadolinium fat  saturated T1 weighted sequences. 14.5 cc of intravenous Dotarem is  utilized.      FINDINGS:  There is a well-defined fluid intensity mass/cyst in the lateral soft  tissues at the level of the joint line. This lies just below the skin  surface, deforming the skin surface. This finding lies superficial to  the lateral collateral ligament complex at the level of the distal  iliotibial band and biceps femoris tendon. There may be a tiny  component deep to the biceps femoris tendon below the joint line and  anterior to the fibular head, though no clear extension into the  lateral meniscus. This finding has a multilobular appearance and  measures 3.8 cm in craniocaudal diameter and 4.0 x 2.2 cm in AP and  transverse diameter. There is a thin enhancing wall. There is no  internal nodular enhancement. The lesion is of homogeneous fluid  signal. There is some adjacent ill-defined fluid anterior and  posterior to the lesion in the subcutaneous fat.      There are findings of tricompartmental osteoarthritis with diffuse  thinning of the articular cartilage, joint space narrowing, and  marginal osteophyte formation. This appearance is more pronounced at  the medial compartment.      There is no evidence of acute osseous fracture, bone marrow edema, or  osseous mass. There is no loose osteochondral lesion. Few tiny foci  of subcortical  degenerative signal is identified at multiple sites.  This study is not tailored to evaluate the menisci. Complex tearing  of the posterior horn of the medial meniscus can not be excluded.      The anterior and posterior cruciate ligaments, lateral collateral  ligament complex, popliteus tendon, medial collateral ligament,  extensor complex, and patellar retinacula are intact.      There is a small joint effusion.      Impression: Lobular cyst/ganglion is identified in the lateral subcutaneous fat  of the joint line, superficial to the lateral collateral ligament  complex. There is no clear communication with the lateral meniscus.      Tricompartmental osteoarthritis, more pronounced in the medial  compartment.      The study is not tailored to evaluate the menisci though underlying  tearing of the posterior horn of the medial meniscus can not be  excluded.      No acute osseous abnormality.      No sign of acute ligament disruption.          MACRO:  None      Signed by: Celestino Flores 9/5/2024 11:15 AM  Dictation workstation:   [id number]       Assessment   Left knee lateral ganglion cyst  Osteoarthrosis left knee     Plan  I had a lengthy discussion with the patient.  I offered cyst removal since she thinks this is unsightly.  I explained to her though that there would be a 20 to 50% recurrence rate given the underlying knee arthrosis which is likely causing this.  I explained to her what is causing the cyst and that the ultimate cure is addressing what is causing the fluid which in her case would be knee arthrosis.  She still would like to have this removed but not at this point in time.  She will follow-up a week or 2 prior to when she would like this removed and we would schedule this.  I did explain the procedure along with the recovery.            Allergies   Allergen Reactions    Triamcinolone Acetonide Rash     Rash erupted on foot, under arms, and on abdomen after using the topical ointment for a  couple days; may be allergy to topical medication although she was using it locally to the right foot       REVIEW OF SYSTEMS  General: Negative for malaise, significant weight loss, fever  Musculoskeletal: See HPI  Neuro:  Negative for numbness/tingling      Medication Documentation Review Audit       Reviewed by Sherry Jessica MA (Medical Assistant) on 08/21/24 at 1629      Medication Order Taking? Sig Documenting Provider Last Dose Status   cetirizine (ZyrTEC) 10 mg tablet [id number] No TAKE 1 TABLET BY MOUTH EVERY DAY AS NEEDED for allergies Historical Provider, MD Not Taking Active   escitalopram (Lexapro) 10 mg tablet [id number] Yes Take 1 tablet (10 mg) by mouth early in the morning.. Historical Provider, MD Taking Active   FLUoxetine (PROzac) 20 mg tablet [id number] No take 1 tablet by oral route every day in the morning Historical Provider, MD Not Taking Active   fluticasone (Flonase) 50 mcg/actuation nasal spray [id number] No spray 1 - 2 spray by intranasal route every day in each nostril as needed Historical Provider, MD Not Taking Active   levonorgestrel (Mirena) 21 mcg/24 hr (8 yrs) 52 mg IUD [id number] Yes 52 mg by intrauterine route. Historical Provider, MD Taking Active                    HPI:  Left knee lateral ganglion cyst, MRI confirmation.    PHYSICAL EXAM  General Appearance/Mental Status: A&Ox3, no apparent distress  HEENT: Normal  Lungs: Clear  Heart: RRR  Abdomen: Soft, nontender  Extremities: Abnormal large palpable ganglion cyst lateral to the left knee along the fibular head/lateral knee joint    Assessment:  Left knee lateral ganglion cyst    Plan:  Excision of left knee lateral ganglion cyst  All medications and allergies reviewed  All questions answered  Follow-up postoperatively

## 2024-10-25 ENCOUNTER — OFFICE VISIT (OUTPATIENT)
Dept: ORTHOPEDIC SURGERY | Facility: CLINIC | Age: 59
End: 2024-10-25
Payer: COMMERCIAL

## 2024-10-25 DIAGNOSIS — M67.40 GANGLION, JOINT: Primary | ICD-10-CM

## 2024-10-25 DIAGNOSIS — M17.12 PRIMARY OSTEOARTHRITIS OF LEFT KNEE: ICD-10-CM

## 2024-10-25 PROCEDURE — 99213 OFFICE O/P EST LOW 20 MIN: CPT | Performed by: ORTHOPAEDIC SURGERY
